# Patient Record
Sex: FEMALE | Employment: UNEMPLOYED
[De-identification: names, ages, dates, MRNs, and addresses within clinical notes are randomized per-mention and may not be internally consistent; named-entity substitution may affect disease eponyms.]

---
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
## 2024-05-08 ENCOUNTER — OFFICE VISIT (OUTPATIENT)
Dept: INTERNAL MEDICINE CLINIC | Facility: CLINIC | Age: 19
End: 2024-05-08
Payer: COMMERCIAL

## 2024-05-08 VITALS
SYSTOLIC BLOOD PRESSURE: 110 MMHG | BODY MASS INDEX: 33.38 KG/M2 | TEMPERATURE: 98 F | HEART RATE: 80 BPM | OXYGEN SATURATION: 98 % | DIASTOLIC BLOOD PRESSURE: 60 MMHG | HEIGHT: 62 IN | WEIGHT: 181.38 LBS | RESPIRATION RATE: 16 BRPM

## 2024-05-08 DIAGNOSIS — Z87.09 HISTORY OF ENLARGED TONSILS: ICD-10-CM

## 2024-05-08 DIAGNOSIS — Z00.00 ANNUAL PHYSICAL EXAM: Primary | ICD-10-CM

## 2024-05-08 DIAGNOSIS — Z11.3 SCREEN FOR STD (SEXUALLY TRANSMITTED DISEASE): ICD-10-CM

## 2024-05-08 PROCEDURE — 99385 PREV VISIT NEW AGE 18-39: CPT | Performed by: INTERNAL MEDICINE

## 2024-05-08 NOTE — H&P
Subjective:   Lima Pacheco is a 18 year old female  who presents for Physical (NP/)     No acute complaints.   Reports having enlarged tonsils when sick and can be quit bothersome. No current symptoms. Pt Contemplating tonsillectomy.   Denies family hx of breast or colon cancer.  Denies breast or nipple changes.   Sexually active. Encouraged to use condoms.   No missed menses. No STD symptoms but would like testing  Remainder of ROS negative.  History/Other:    Chief Complaint Reviewed and Verified  Nursing Notes Reviewed and   Verified  Tobacco Reviewed  Allergies Reviewed  Medications Reviewed    Problem List Reviewed  Medical History Reviewed  Surgical History   Reviewed  OB Status Reviewed  Family History Reviewed  Social History   Reviewed         No current outpatient medications on file.       Review of Systems:  Pertinent items are noted in HPI.  A comprehensive 10 point review of systems was completed.  Pertinent positives and negatives noted in the the HPI.        Objective:   /60 (BP Location: Left arm, Patient Position: Sitting, Cuff Size: adult)   Pulse 80   Temp 97.9 °F (36.6 °C) (Temporal)   Resp 16   Ht 5' 2\" (1.575 m)   Wt 181 lb 6.4 oz (82.3 kg)   LMP 04/28/2024 (Exact Date)   SpO2 98%   BMI 33.18 kg/m²  Estimated body mass index is 33.18 kg/m² as calculated from the following:    Height as of this encounter: 5' 2\" (1.575 m).    Weight as of this encounter: 181 lb 6.4 oz (82.3 kg).  PHYSICAL EXAM:   General: no acute distress   Eyes: pupils equal and reactive; EOMI; sclera normal; conjunctiva normal   ENT: without erythema or exudate  Neck: trachea midline; no adenopathy; thyroid not enlarged   Hematologic/lymphatic:no cervical lymphadenopathy; no supraclavicular adenopathy   Respiratory: no increased work of breathing; good air exchange; CTAB; no crackles or wheezing   Cardiovascular: RRR; S1, S2; no murmurs; no carotid bruits; no edema   Gastrointestinal: normal  bowel sounds; soft; non-distended; non-tender  Neurological: awake, alert, oriented x3; CNII-XII grossly intact;  MSK: full ROM; strength 5/5  Behavioral/Psych: euthymic; appropriate affect    Breasts: pt declined chaperone. symmetrical; no masses or nipple discharge or rashes/lesions noted   : declined       Assessment & Plan:   Lima was seen today for physical.    Diagnoses and all orders for this visit:    Annual physical exam  -     Vitamin D; Future  -     CBC With Differential With Platelet; Future  -     Comp Metabolic Panel (14); Future  -     Hemoglobin A1C; Future  -     TSH W Reflex To Free T4; Future  -     Lipid Panel; Future  -     T Pallidum Screening [place]; Future  -     HIV AG AB Combo (Consent Obtained prechecked); Future  -     Urine Chlamydia/GC Amplification; Future    Screen for STD (sexually transmitted disease)  -     T Pallidum Screening [place]; Future  -     HIV AG AB Combo (Consent Obtained prechecked); Future  -     Urine Chlamydia/GC Amplification; Future    History of enlarged tonsils  Comments:  pt interested in getting tonsils removed  Orders:  -     ENT Referral - In Network                Tiffany López MD

## 2024-05-13 ENCOUNTER — LAB ENCOUNTER (OUTPATIENT)
Dept: LAB | Age: 19
End: 2024-05-13
Attending: INTERNAL MEDICINE

## 2024-05-13 DIAGNOSIS — Z00.00 ANNUAL PHYSICAL EXAM: ICD-10-CM

## 2024-05-13 DIAGNOSIS — Z11.3 SCREEN FOR STD (SEXUALLY TRANSMITTED DISEASE): ICD-10-CM

## 2024-05-13 LAB
ALBUMIN SERPL-MCNC: 4.3 G/DL (ref 3.2–4.8)
ALBUMIN/GLOB SERPL: 1.4 {RATIO} (ref 1–2)
ALP LIVER SERPL-CCNC: 76 U/L
ALT SERPL-CCNC: 20 U/L
ANION GAP SERPL CALC-SCNC: 7 MMOL/L (ref 0–18)
AST SERPL-CCNC: 23 U/L (ref ?–34)
BASOPHILS # BLD AUTO: 0.07 X10(3) UL (ref 0–0.2)
BASOPHILS NFR BLD AUTO: 0.7 %
BILIRUB SERPL-MCNC: 0.3 MG/DL (ref 0.3–1.2)
BUN BLD-MCNC: 9 MG/DL (ref 9–23)
BUN/CREAT SERPL: 14.5 (ref 10–20)
CALCIUM BLD-MCNC: 9.3 MG/DL (ref 8.7–10.4)
CHLORIDE SERPL-SCNC: 108 MMOL/L (ref 98–112)
CHOLEST SERPL-MCNC: 114 MG/DL (ref ?–200)
CO2 SERPL-SCNC: 26 MMOL/L (ref 21–32)
CREAT BLD-MCNC: 0.62 MG/DL
DEPRECATED RDW RBC AUTO: 43 FL (ref 35.1–46.3)
EGFRCR SERPLBLD CKD-EPI 2021: 131 ML/MIN/1.73M2 (ref 60–?)
EOSINOPHIL # BLD AUTO: 0.55 X10(3) UL (ref 0–0.7)
EOSINOPHIL NFR BLD AUTO: 5.5 %
ERYTHROCYTE [DISTWIDTH] IN BLOOD BY AUTOMATED COUNT: 13.2 % (ref 11–15)
EST. AVERAGE GLUCOSE BLD GHB EST-MCNC: 100 MG/DL (ref 68–126)
FASTING PATIENT LIPID ANSWER: YES
FASTING STATUS PATIENT QL REPORTED: YES
GLOBULIN PLAS-MCNC: 3 G/DL (ref 2–3.5)
GLUCOSE BLD-MCNC: 91 MG/DL (ref 70–99)
HBA1C MFR BLD: 5.1 % (ref ?–5.7)
HCT VFR BLD AUTO: 41.5 %
HDLC SERPL-MCNC: 43 MG/DL (ref 40–59)
HGB BLD-MCNC: 13.6 G/DL
IMM GRANULOCYTES # BLD AUTO: 0.03 X10(3) UL (ref 0–1)
IMM GRANULOCYTES NFR BLD: 0.3 %
LDLC SERPL CALC-MCNC: 57 MG/DL (ref ?–100)
LYMPHOCYTES # BLD AUTO: 3.81 X10(3) UL (ref 1.5–5)
LYMPHOCYTES NFR BLD AUTO: 37.8 %
MCH RBC QN AUTO: 29.4 PG (ref 26–34)
MCHC RBC AUTO-ENTMCNC: 32.8 G/DL (ref 31–37)
MCV RBC AUTO: 89.6 FL
MONOCYTES # BLD AUTO: 0.61 X10(3) UL (ref 0.1–1)
MONOCYTES NFR BLD AUTO: 6.1 %
NEUTROPHILS # BLD AUTO: 5 X10 (3) UL (ref 1.5–7.7)
NEUTROPHILS # BLD AUTO: 5 X10(3) UL (ref 1.5–7.7)
NEUTROPHILS NFR BLD AUTO: 49.6 %
NONHDLC SERPL-MCNC: 71 MG/DL (ref ?–130)
OSMOLALITY SERPL CALC.SUM OF ELEC: 290 MOSM/KG (ref 275–295)
PLATELET # BLD AUTO: 205 10(3)UL (ref 150–450)
POTASSIUM SERPL-SCNC: 3.8 MMOL/L (ref 3.5–5.1)
PROT SERPL-MCNC: 7.3 G/DL (ref 5.7–8.2)
RBC # BLD AUTO: 4.63 X10(6)UL
SODIUM SERPL-SCNC: 141 MMOL/L (ref 136–145)
T PALLIDUM AB SER QL IA: NONREACTIVE
TRIGL SERPL-MCNC: 68 MG/DL (ref 30–149)
TSI SER-ACNC: 1.82 MIU/ML (ref 0.48–4.17)
VIT D+METAB SERPL-MCNC: 12.9 NG/ML (ref 30–100)
VLDLC SERPL CALC-MCNC: 10 MG/DL (ref 0–30)
WBC # BLD AUTO: 10.1 X10(3) UL (ref 4–11)

## 2024-05-13 PROCEDURE — 84443 ASSAY THYROID STIM HORMONE: CPT

## 2024-05-13 PROCEDURE — 86780 TREPONEMA PALLIDUM: CPT

## 2024-05-13 PROCEDURE — 83036 HEMOGLOBIN GLYCOSYLATED A1C: CPT

## 2024-05-13 PROCEDURE — 87389 HIV-1 AG W/HIV-1&-2 AB AG IA: CPT

## 2024-05-13 PROCEDURE — 87491 CHLMYD TRACH DNA AMP PROBE: CPT

## 2024-05-13 PROCEDURE — 82306 VITAMIN D 25 HYDROXY: CPT

## 2024-05-13 PROCEDURE — 80053 COMPREHEN METABOLIC PANEL: CPT

## 2024-05-13 PROCEDURE — 87591 N.GONORRHOEAE DNA AMP PROB: CPT

## 2024-05-13 PROCEDURE — 80061 LIPID PANEL: CPT

## 2024-05-13 PROCEDURE — 85025 COMPLETE CBC W/AUTO DIFF WBC: CPT

## 2024-05-13 PROCEDURE — 36415 COLL VENOUS BLD VENIPUNCTURE: CPT

## 2024-05-14 DIAGNOSIS — E55.9 VITAMIN D DEFICIENCY: ICD-10-CM

## 2024-05-14 DIAGNOSIS — R79.89 HIGH SERUM VITAMIN D: Primary | ICD-10-CM

## 2024-05-14 LAB
C TRACH DNA SPEC QL NAA+PROBE: NEGATIVE
N GONORRHOEA DNA SPEC QL NAA+PROBE: NEGATIVE

## 2024-05-14 RX ORDER — ERGOCALCIFEROL 1.25 MG/1
50000 CAPSULE ORAL WEEKLY
Qty: 8 CAPSULE | Refills: 0 | Status: SHIPPED | OUTPATIENT
Start: 2024-05-14 | End: 2024-06-13

## 2024-08-22 ENCOUNTER — OFFICE VISIT (OUTPATIENT)
Facility: CLINIC | Age: 19
End: 2024-08-22
Payer: COMMERCIAL

## 2024-08-22 VITALS
WEIGHT: 185 LBS | HEIGHT: 62 IN | DIASTOLIC BLOOD PRESSURE: 70 MMHG | BODY MASS INDEX: 34.04 KG/M2 | SYSTOLIC BLOOD PRESSURE: 122 MMHG

## 2024-08-22 DIAGNOSIS — Z11.3 SCREENING EXAMINATION FOR STD (SEXUALLY TRANSMITTED DISEASE): ICD-10-CM

## 2024-08-22 DIAGNOSIS — Z01.419 ENCOUNTER FOR WELL WOMAN EXAM WITH ROUTINE GYNECOLOGICAL EXAM: Primary | ICD-10-CM

## 2024-08-22 DIAGNOSIS — Z30.011 ENCOUNTER FOR INITIAL PRESCRIPTION OF CONTRACEPTIVE PILLS: ICD-10-CM

## 2024-08-22 PROCEDURE — 87591 N.GONORRHOEAE DNA AMP PROB: CPT

## 2024-08-22 PROCEDURE — 87491 CHLMYD TRACH DNA AMP PROBE: CPT

## 2024-08-22 PROCEDURE — 99385 PREV VISIT NEW AGE 18-39: CPT

## 2024-08-22 RX ORDER — NORETHINDRONE ACETATE AND ETHINYL ESTRADIOL 1MG-20(21)
1 KIT ORAL DAILY
Qty: 28 TABLET | Refills: 11 | Status: SHIPPED | OUTPATIENT
Start: 2024-08-22 | End: 2025-08-22

## 2024-08-22 NOTE — PROGRESS NOTES
GYN H&P     Genetic questionnaire reviewed with the patient and she will be referred for genetic counseling if the questionnaire had any positive results.    [facility] intake form was also reviewed regarding contraception, menstrual periods, urinary health, and vaginal / sexual health    2024  2:25 PM    Chief Complaint   Patient presents with    Contraception       HPI: Lima is a 19 year old  Patient's last menstrual period was 2024 (exact date).  (contraception: condoms) here for her annual gyn exam.     She has no complaints.   Menses are regular. Denies any pelvic or breast complaints. Interested in starting something for contraception - currently using condoms.    Previous encounters and chart reviewed.     OB History    Para Term  AB Living   0 0 0 0 0 0   SAB IAB Ectopic Multiple Live Births   0 0 0 0 0       GYN hx:   Menarche: 12  Period Cycle (Days): 25-34  Period Duration (Days): 4-5  Period Flow: mild  Use of Birth Control (if yes, specify type): None  Follow Up Recommendation: age 20      History reviewed. No pertinent past medical history.  History reviewed. No pertinent surgical history.  Allergies   Allergen Reactions    Adhesive Tape HIVES and SWELLING     No current outpatient medications on file prior to visit.     No current facility-administered medications on file prior to visit.     Family History   Problem Relation Age of Onset    Diabetes Maternal Grandmother     Diabetes Maternal Grandfather     Diabetes Paternal Grandmother     Diabetes Paternal Grandfather     Cancer Paternal Grandfather         in eye     Social History     Socioeconomic History    Marital status: Single     Spouse name: Not on file    Number of children: Not on file    Years of education: Not on file    Highest education level: Not on file   Occupational History    Not on file   Tobacco Use    Smoking status: Never     Passive exposure: Never    Smokeless tobacco:  Never   Vaping Use    Vaping status: Never Used   Substance and Sexual Activity    Alcohol use: Yes     Comment: Very Occ    Drug use: Never    Sexual activity: Yes     Partners: Male   Other Topics Concern    Caffeine Concern No    Exercise Yes    Seat Belt No    Special Diet No    Stress Concern No    Weight Concern Yes   Social History Narrative    Not on file     Social Determinants of Health     Financial Resource Strain: Not on file   Food Insecurity: Not on file   Transportation Needs: Not on file   Physical Activity: Not on file   Stress: Not on file   Social Connections: Not on file   Housing Stability: Low Risk  (6/14/2024)    Received from [facility]    Housing Stability     Mortgage Payment Concerns?: Not on file     Number of Places Lived in the Last Year: Not on file     Unstable Housing?: Not on file       ROS:     Review of Systems:  General: denies fevers, chills, fatigue and malaise.   Eyes: no visual changes, denies headaches  ENT: no complaints, denies earaches, runny nose, epistaxis, throat pain or sore throat  Respiratory: denies SOB, dyspnea, cough or wheezing  Cardiovascular: denies chest pain, palpitations, exercise intolerance   GI: denies abdominal pain, diarrhea, constipation  : no complaints, denies dysuria, increased urinary frequency. Menses regular, no dysmenorrhea, no menorrhagia, no dyspareunia   Hematological/lymphatic: denies history of excessive bleeding or bruising, denies dizziness, lightheadedness.   Breast: denies rashes, skin changes, pain, lumps or discharge   Psychiatric: denies depression, changes in sleep patterns, anxiety  Endocrine: denies hot or cold intolerance, mood changes   Neurological: denies changes in sight, smell, hearing or taste. Denies seizures or tremors  Immunological: denies allergies, denies anaphylaxis, or swollen lymph nodes  Musculoskeletal: denies joint pain, morning stiffness, decreased range of motion         O Ht 62\"   Wt  185 lb (83.9 kg)   LMP 07/20/2024 (Exact Date)   BMI 33.84 kg/m²         Wt Readings from Last 6 Encounters:   08/22/24 185 lb (83.9 kg)   05/08/24 181 lb 6.4 oz (82.3 kg) (95%, Z= 1.62)*     * Growth percentiles are based on [facility] (Girls, 2-20 Years) data.     Exam:   GENERAL: well developed, well nourished, in no apparent distress, oriented.  SKIN: no rashes, no suspicious lesions  HEENT: normal  NECK: supple; no thyromegaly, no adenopathy  LUNGS: clear to auscultation  CARDIOVASCULAR: normal S1, S2, RRR  BREASTS: soft, nontender, no palpable masses or nodes, no nipple discharge, no skin changes, no axillary adenopathy  ABDOMEN: no scars,  soft, non distended; non tender, no masses  PELVIC: External Genitalia: Normal appearing, no lesions.    Vagina: normal pink mucosa, no lesions, normal clear discharge.    Bladder well supported.  No  anterior or posterior hernias    Cervix: nulliparous, no lesions , No CMT     Uterus: AVAF, mobile, non tender, normal size    Adnexa: non tender, no masses, normal size    Rectal: deferred  EXTREMITIES:  non tender without edema        A/P: Patient is 19 year old female with no complaints. Here for well woman exam.            Patient counseled on:    Diet/exercise.      Self Breast Exams     Safe sex practices / and living environment     Vaccines:  Annual Flu, Tdap +/- Gardasil  recommended (up to 45 yrs) -series completed      Pneumococcal at 65 yrs old, Shingles at 60 yrs old          Pap: neg/neg - Year:  n/a  GC/Chlamydia:  collected today via urine         Meds This Visit:    Requested Prescriptions     Signed Prescriptions Disp Refills    Norethin Ace-Eth Estrad-FE (JUNEL FE 1/20) 1-20 MG-MCG Oral Tab 28 tablet 11     Sig: Take 1 tablet by mouth daily.       1. Screening examination for STD (sexually transmitted disease)  - Chlamydia/Gc Amplification; Future    2. Encounter for well woman exam with routine gynecological exam    3. Encounter for initial prescription of  contraceptive pills    Doing well, discussed contraceptive methods including OCPs, patch, ring, depo, nexplanon, and IUD - pt chooses to try OCPs at this time. Appropriate for initiation, rx sent. Discussed starting pills and ACHES related to OCP use, pt verbalized understanding.    Oral contraceptive hormonal pill information and precautions for the women [facility]    Possible benefits of oral contraceptives    -To 90% effective in preventing pregnancy, which means in 100 women who start out the year using the pill and use it consistently, the lowest failure rate at the end of the year will be 1  -Decreased menstrual cramps  -Decreased menstrual bleeding  -More regular menstrual bleeding  -Decrease pain at the time of ovulation  -Less risk of pelvic inflammatory disease  -Improvement in acne after the first 1 to 2 months of use  -Decreased risk of ovarian cancer  -Decreased risk of uterine cancer  -Decreased risk of colon cancer    Possible risks of oral contraceptives:     Major risks:    -Blood clots in the legs or lungs  -Stroke or heart attack  -Gallbladder disease  -Liver tumors  -Death and high risk patients  -Slight increased risk of breast cancer although this risk is extremely small    Minor risks:  -Nausea  -Irregular spotting between periods  -Increased menstrual bleeding  -Breast tenderness  -Possible weight gain related to increase in dietary habits  -Headache  -Depression  -High blood pressure  -Hyperpigmentation or darkening of the skin of the face  -Worsening acne  -Increased propensity for yeast infections    The Majority of the serious complications and pill users occurs in women over the age of 30 who smoke or have other high risk factors for blood clotting    General Precautions and What If's    If you are taking antibiotics, use a back-up method of contraception while on the antibiotics.  Some antibiotics may interfere with the effectiveness of the birth control pill    Taking your  pills:  Take your first pill on the first Sunday after your menstrual period begins.  If your period begins on Sunday begin that day  Take 1 pill every day around the same time  Member to use another method of birth control for the first month to take the pill  2.  Should begin sometime during the last week of your pills if you are using a 28-day pack, or sometime during the first 7 days you are not taking your pills if you are using a 21-day pack.    Missed pills:  If you miss 1 pill take the missed pill as soon as you remember and take your regular pill at the usual time  If you missed 2 pills in a row take 2 pills as soon as you remember and 2 more the next day.  Use another method of birth control until you finish that packet  If you miss 3 or more pills you are not protected use another form of birth control weight to your next.  And restart a new pack on the Sunday that follows    Stopping pills:  May stop the pill at the end of any pack must use another form of birth control if you wish to avoid pregnancy  You may try to conceive after the first cycle off the hormonal contraception  If your periods were irregular heavy or very painful before taking the pill they may return to this pattern    Danger signs  The abdominal pain/severe  C chest pain/severe cough or shortness of breath  H headache severe dizziness weakness or numbness  GI problems vision loss or blurring speech problems  S severe leg cramps calf or thigh  Call us immediately if any of these danger signs occur      Return in about 3 months (around 11/22/2024) for birth control follow up.    LizbethVIOLETTE Moscoso   8/22/2024  2:25 PM       This note was created by [employer] voice recognition. Errors in content may be related to improper recognition by the system; efforts to review and correct have been done but errors may still exist. Please contact me with any questions.

## 2024-08-23 LAB
C TRACH DNA SPEC QL NAA+PROBE: NEGATIVE
N GONORRHOEA DNA SPEC QL NAA+PROBE: NEGATIVE

## 2024-10-18 ENCOUNTER — APPOINTMENT (OUTPATIENT)
Dept: GENERAL RADIOLOGY | Age: 19
End: 2024-10-18
Attending: EMERGENCY MEDICINE
Payer: COMMERCIAL

## 2024-10-18 ENCOUNTER — HOSPITAL ENCOUNTER (OUTPATIENT)
Age: 19
Discharge: HOME | End: 2024-10-18
Payer: COMMERCIAL

## 2024-10-18 VITALS
RESPIRATION RATE: 16 BRPM | HEART RATE: 86 BPM | SYSTOLIC BLOOD PRESSURE: 128 MMHG | DIASTOLIC BLOOD PRESSURE: 68 MMHG | BODY MASS INDEX: 34.04 KG/M2 | TEMPERATURE: 98 F | HEIGHT: 62 IN | OXYGEN SATURATION: 100 % | WEIGHT: 185 LBS

## 2024-10-18 DIAGNOSIS — S93.602A SPRAIN OF LEFT FOOT, INITIAL ENCOUNTER: Primary | ICD-10-CM

## 2024-10-18 PROCEDURE — 99204 OFFICE O/P NEW MOD 45 MIN: CPT

## 2024-10-18 PROCEDURE — 99214 OFFICE O/P EST MOD 30 MIN: CPT

## 2024-10-18 PROCEDURE — 73630 X-RAY EXAM OF FOOT: CPT | Performed by: EMERGENCY MEDICINE

## 2024-10-18 RX ORDER — IBUPROFEN 600 MG/1
600 TABLET, FILM COATED ORAL EVERY 8 HOURS PRN
Qty: 30 TABLET | Refills: 0 | Status: SHIPPED | OUTPATIENT
Start: 2024-10-18 | End: 2024-10-28

## 2024-10-18 NOTE — ED PROVIDER NOTES
Patient Seen in: [facility]      History     Chief Complaint   Patient presents with    Foot Injury     Stated Complaint: Leg or Foot Injury    Subjective:   HPI  Lima Pacheco is 19 year old female presents with acute left foot ankle pain due to inversion injury while walking down a flight of stairs.  Patient reports pain localized to dorsum of left foot.  Non radiating, worsened w/ ambulation, weight bearing, and palpation.  No numbness, tingling, parasthesias, skin/ color/ temperature/ sensory changes reported.  No medications taken prior to arrival.           Objective:     History reviewed. No pertinent past medical history.           History reviewed. No pertinent surgical history.             Social History     Socioeconomic History    Marital status: Single   Tobacco Use    Smoking status: Never     Passive exposure: Never    Smokeless tobacco: Never   Vaping Use    Vaping status: Never Used   Substance and Sexual Activity    Alcohol use: Yes     Comment: Very Occ    Drug use: Never    Sexual activity: Yes     Partners: Male   Other Topics Concern    Caffeine Concern No    Exercise Yes    Seat Belt No    Special Diet No    Stress Concern No    Weight Concern Yes     Social Drivers of Health      Received from [facility]    Housing Stability              Review of Systems   All other systems reviewed and are negative.      Positive for stated complaint: Leg or Foot Injury  Other systems are as noted in HPI.  Constitutional and vital signs reviewed.      All other systems reviewed and negative except as noted above.    Physical Exam     ED Triage Vitals [10/18/24 1144]   /68   Pulse 86   Resp 16   Temp 98.2 °F (36.8 °C)   Temp src Temporal   SpO2 100 %   O2 Device None (Room air)       Current Vitals:   Vital Signs  BP: 128/68  Pulse: 86  Resp: 16  Temp: 98.2 °F (36.8 °C)  Temp src: Temporal    Oxygen Therapy  SpO2: 100 %  O2 Device: None (Room  air)        Physical Exam  Vitals and nursing note reviewed.   Constitutional:       General: She is not in acute distress.     Appearance: Normal appearance. She is normal weight. She is not ill-appearing, toxic-appearing or diaphoretic.   HENT:      Head: Normocephalic and atraumatic.      Right Ear: External ear normal.      Left Ear: External ear normal.      Nose: Nose normal.   Eyes:      Extraocular Movements: Extraocular movements intact.      Conjunctiva/sclera: Conjunctivae normal.      Pupils: Pupils are equal, round, and reactive to light.   Pulmonary:      Effort: Pulmonary effort is normal. No respiratory distress.   Musculoskeletal:         General: Swelling, tenderness and signs of injury present. No deformity. Normal range of motion.      Cervical back: Normal range of motion and neck supple.      Comments: Soft tissue swelling to dorsum of left foot with associated tenderness to palpation otherwise capillary refill less than 2 seconds, DP/ PT pulses intact 2+ bilaterally      Skin:     General: Skin is warm and dry.      Capillary Refill: Capillary refill takes less than 2 seconds.      Coloration: Skin is not jaundiced or pale.      Findings: No bruising, erythema, lesion or rash.   Neurological:      General: No focal deficit present.      Mental Status: She is alert and oriented to person, place, and time. Mental status is at baseline.      Gait: Gait normal.   Psychiatric:         Mood and Affect: Mood normal.         Behavior: Behavior normal.             ED Course   Labs Reviewed - No data to display  XR FOOT, COMPLETE (MIN 3 VIEWS), LEFT (CPT=73630)   Final Result   PROCEDURE:  XR FOOT, COMPLETE (MIN 3 VIEWS), LEFT (CPT=73630)       TECHNIQUE:  AP, oblique, and lateral views were obtained.       COMPARISON:  None.       INDICATIONS:  Status post trip and fall injury, left foot pain most    notably along the lateral aspect of the foot.       PATIENT STATED HISTORY: (As transcribed by  Technologist)  Patient states    that she tripped and fell while going up stairs today. Patient injured her    left foot and now has pain and swelling located along the lateral aspect    of her left foot. Patient states    that the pain is constant and worsens when she walks on her left foot.            FINDINGS:     BONES:  Normal.  No significant arthropathy or acute abnormality.   SOFT TISSUES:  Negative.  No visible soft tissue swelling.   EFFUSION:  None visible.   OTHER:  Negative.                         =====   CONCLUSION:  Negative exam.           LOCATION:  [facility]           Dictated by (CST): Dione Parnell DO on 10/18/2024 at 12:13 PM        Finalized by (CST): Dione Parnell DO on 10/18/2024 at 12:14 PM                         [facility]             Medical Decision Making  19 year old female presents with left foot contusion due to mechanical fall shortly prior to arrival.    Plan  - x ray: left foot   - ACE wrap/  post op shoe/ Crutches  - OTC: Tylenol 1g po q 6 hours/ prn.   - rx: ibuprofen 600mg po q8 hours/ prn.   - RICE Apply a compressive ACE bandage. Rest and elevate the affected painful area. Apply cold compresses intermittently as needed. As pain recedes, begin normal activities slowly as tolerated. Call if symptoms persist.  - explained to patient that if symptoms do not improve that an MRI may be warranted however that will be determined at the discretion of follow up care  - refer to orthopaedics/ PCP  as needed     Amount and/or Complexity of Data Reviewed  Radiology: ordered and independent interpretation performed. Decision-making details documented in ED Course.     Details: No fracture/ dislocation based on my independent interpretation         Disposition and Plan     Clinical Impression:  1. Sprain of left foot, initial encounter         Disposition:  Discharge  10/18/2024 12:40 pm    Follow-up:  Tiffany Rosales MD  [address]  [facility] [address]  [facility]  [address]  [phone]          Bibi Anderson, DPM  [address] [place] [address]  [phone]          [facility]  [address] [facility] [address]  [phone]              Medications Prescribed:  Discharge Medication List as of 10/18/2024  1:04 PM        START taking these medications    Details   ibuprofen 600 MG Oral Tab Take 1 tablet (600 mg total) by mouth every 8 (eight) hours as needed., Normal, Disp-30 tablet, R-0                 Supplementary Documentation:

## 2024-10-18 NOTE — ED INITIAL ASSESSMENT (HPI)
Pt presents with left outer foot swelling and pain. Pt reports mechanical fall up 2-3 steps today approx 1 hour pta. Pts shoe got stuck and foot got pulled, \"heard a crack\", per pt.

## 2024-10-29 ENCOUNTER — TELEPHONE (OUTPATIENT)
Dept: ORTHOPEDICS CLINIC | Facility: CLINIC | Age: 19
End: 2024-10-29

## 2024-10-29 DIAGNOSIS — M79.672 LEFT FOOT PAIN: Primary | ICD-10-CM

## 2024-10-29 NOTE — TELEPHONE ENCOUNTER
XR ordered per ortho protocol. XR scheduled and patient was notified via [facility] to let them know that they should arrive 15-20 minutes early, in order for them to complete imaging.

## 2024-10-29 NOTE — TELEPHONE ENCOUNTER
Patient scheduled an appointment with Dr. Anderson via [facility] on 11/14/24 for left foot pain after injury. Please advise if imaging is needed prior.

## 2025-05-09 ENCOUNTER — OFFICE VISIT (OUTPATIENT)
Dept: INTERNAL MEDICINE CLINIC | Facility: CLINIC | Age: 20
End: 2025-05-09
Payer: COMMERCIAL

## 2025-05-09 VITALS
HEART RATE: 88 BPM | BODY MASS INDEX: 37.58 KG/M2 | OXYGEN SATURATION: 99 % | TEMPERATURE: 98 F | SYSTOLIC BLOOD PRESSURE: 124 MMHG | RESPIRATION RATE: 14 BRPM | HEIGHT: 62 IN | WEIGHT: 204.19 LBS | DIASTOLIC BLOOD PRESSURE: 80 MMHG

## 2025-05-09 DIAGNOSIS — L30.9 DERMATITIS: Primary | ICD-10-CM

## 2025-05-09 PROCEDURE — 99214 OFFICE O/P EST MOD 30 MIN: CPT | Performed by: INTERNAL MEDICINE

## 2025-05-09 RX ORDER — CLOTRIMAZOLE AND BETAMETHASONE DIPROPIONATE 10; .64 MG/G; MG/G
1 CREAM TOPICAL 2 TIMES DAILY
Qty: 60 G | Refills: 0 | Status: SHIPPED | OUTPATIENT
Start: 2025-05-09 | End: 2025-05-23

## 2025-05-09 NOTE — PROGRESS NOTES
Subjective:   Lima Pacheco is a 19 year old female  who presents for Rash       The following individual(s) verbally consented to be recorded using ambient AI listening technology and understand that they can each withdraw their consent to this listening technology at any point by asking the clinician to turn off or pause the recording:    Patient name: Lima Pacheco          History of Present Illness  Lima Pacheco is a 19 year old female who presents with a rash on her left arm.    The rash began one month ago with localized redness and itchiness. It is particularly itchy at night, leading to scabbing from scratching. There is no fever, chills, blistering, or oozing.    She denies exposure to irritants such as tape, band-aids, or creams.     Initial treatment with Aveeno and Eucerin did not help, leading her to switch to Vaseline. She has not used over-the-counter cortisone creams. Her only medication is birth control, used for nine months.    Her medical history includes eczema during childhood,    History/Other:    Chief Complaint Reviewed and Verified  No Further Nursing Notes to   Review  Tobacco Reviewed  Allergies Reviewed  Medications Reviewed    Medical History Reviewed  Surgical History Reviewed  OB Status Reviewed    Family History Reviewed  Social History Reviewed         Current Medications[1]    Review of Systems:  Pertinent items are noted in HPI.  A comprehensive 10 point review of systems was completed.  Pertinent positives and negatives noted in the the HPI.        Objective:   /80 (BP Location: Left arm, Patient Position: Sitting, Cuff Size: adult)   Pulse 88   Temp 97.6 °F (36.4 °C) (Temporal)   Resp 14   Ht 5' 2\" (1.575 m)   Wt 204 lb 3.2 oz (92.6 kg)   LMP 04/22/2025 (Approximate)   SpO2 99%   BMI 37.35 kg/m²  Estimated body mass index is 37.35 kg/m² as calculated from the following:    Height as of this encounter: 5' 2\" (1.575 m).    Weight as  of this encounter: 204 lb 3.2 oz (92.6 kg).    Physical Exam  GENERAL: Alert, cooperative,, no acute distress.  RESP; no increased work of breathing   NEUROLOGICAL: Cranial nerves grossly intact, moves all extremities without gross motor or sensory deficit.  SKIN: Rash on left arm; anterior over cubital area, present for a month, itchy, no blisters,   No diffuse swelling/wounds or cellulitis  Possible fungal w/wo eczema vs other      Assessment & Plan:       Assessment & Plan  Dermatitis   Differential includes eczema, fungal infection, and contact dermatitis vs other. Rash unresponsive to emollients, partially relieved by Vaseline. No systemic symptoms or signs of bacterial infection.  - Prescribed clotrimazole-betamethasone cream for daily use up to two weeks.  - Advised discontinuation if rash worsens or no improvement after two weeks.  - Referred to dermatology for further evaluation if unresolved.      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   If any word or phrase is confusing, it is likely due to recognition error. Please ask the provider for clarification.      Tiffany López MD       [1]   Current Outpatient Medications   Medication Sig Dispense Refill    clotrimazole-betamethasone 1-0.05 % External Cream Apply 1 Application topically in the morning and 1 Application before bedtime. Do all this for 14 days. 60 g 0    Norethin Ace-Eth Estrad-FE (JUNEL FE 1/20) 1-20 MG-MCG Oral Tab Take 1 tablet by mouth daily. 28 tablet 11

## 2025-07-30 RX ORDER — NORETHINDRONE ACETATE AND ETHINYL ESTRADIOL AND FERROUS FUMARATE 1MG-20(21)
1 KIT ORAL DAILY
Qty: 28 TABLET | Refills: 0 | Status: SHIPPED | OUTPATIENT
Start: 2025-07-30

## 2025-08-25 ENCOUNTER — TELEPHONE (OUTPATIENT)
Facility: CLINIC | Age: 20
End: 2025-08-25